# Patient Record
Sex: MALE | Race: WHITE | NOT HISPANIC OR LATINO | Employment: UNEMPLOYED | URBAN - METROPOLITAN AREA
[De-identification: names, ages, dates, MRNs, and addresses within clinical notes are randomized per-mention and may not be internally consistent; named-entity substitution may affect disease eponyms.]

---

## 2018-02-28 ENCOUNTER — APPOINTMENT (OUTPATIENT)
Dept: GENERAL RADIOLOGY | Facility: HOSPITAL | Age: 43
End: 2018-02-28

## 2018-02-28 ENCOUNTER — APPOINTMENT (OUTPATIENT)
Dept: CT IMAGING | Facility: HOSPITAL | Age: 43
End: 2018-02-28

## 2018-02-28 ENCOUNTER — HOSPITAL ENCOUNTER (EMERGENCY)
Facility: HOSPITAL | Age: 43
Discharge: HOME | End: 2018-02-28
Attending: EMERGENCY MEDICINE | Admitting: EMERGENCY MEDICINE

## 2018-02-28 VITALS
HEART RATE: 88 BPM | HEIGHT: 67 IN | BODY MASS INDEX: 25.11 KG/M2 | WEIGHT: 160 LBS | OXYGEN SATURATION: 92 % | SYSTOLIC BLOOD PRESSURE: 134 MMHG | TEMPERATURE: 98.2 F | DIASTOLIC BLOOD PRESSURE: 78 MMHG | RESPIRATION RATE: 16 BRPM

## 2018-02-28 DIAGNOSIS — S20.212A CONTUSION OF RIB ON LEFT SIDE, INITIAL ENCOUNTER: ICD-10-CM

## 2018-02-28 DIAGNOSIS — S16.1XXA STRAIN OF NECK MUSCLE, INITIAL ENCOUNTER: ICD-10-CM
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
## 2018-02-28 DIAGNOSIS — S39.012A STRAIN OF LUMBAR REGION, INITIAL ENCOUNTER: ICD-10-CM

## 2018-02-28 DIAGNOSIS — S29.019A THORACIC MYOFASCIAL STRAIN, INITIAL ENCOUNTER: ICD-10-CM

## 2018-02-28 PROCEDURE — 70450 CT HEAD/BRAIN W/O DYE: CPT

## 2018-02-28 PROCEDURE — 72072 X-RAY EXAM THORAC SPINE 3VWS: CPT

## 2018-02-28 PROCEDURE — 71101 X-RAY EXAM UNILAT RIBS/CHEST: CPT

## 2018-02-28 PROCEDURE — 99283 EMERGENCY DEPT VISIT LOW MDM: CPT

## 2018-02-28 PROCEDURE — 72110 X-RAY EXAM L-2 SPINE 4/>VWS: CPT

## 2018-02-28 PROCEDURE — 99284 EMERGENCY DEPT VISIT MOD MDM: CPT | Performed by: PHYSICIAN ASSISTANT

## 2018-02-28 PROCEDURE — 72050 X-RAY EXAM NECK SPINE 4/5VWS: CPT

## 2018-02-28 RX ORDER — HYDROCODONE BITARTRATE AND ACETAMINOPHEN 5; 325 MG/1; MG/1
1 TABLET ORAL EVERY 6 HOURS PRN
COMMUNITY
End: 2018-03-15

## 2018-02-28 RX ORDER — FAMOTIDINE 10 MG
10 TABLET ORAL 2 TIMES DAILY
COMMUNITY
End: 2018-03-15

## 2018-02-28 RX ORDER — NAPROXEN 375 MG/1
375 TABLET ORAL 2 TIMES DAILY PRN
Qty: 20 TABLET | Refills: 0 | Status: SHIPPED | OUTPATIENT
Start: 2018-02-28 | End: 2018-03-15

## 2018-02-28 RX ORDER — METHOCARBAMOL 750 MG/1
750 TABLET, FILM COATED ORAL 3 TIMES DAILY PRN
Qty: 20 TABLET | Refills: 0 | Status: SHIPPED | OUTPATIENT
Start: 2018-02-28 | End: 2018-03-15

## 2018-02-28 RX ORDER — IBUPROFEN 400 MG/1
800 TABLET ORAL ONCE
Status: DISCONTINUED | OUTPATIENT
Start: 2018-02-28 | End: 2018-02-28

## 2018-02-28 RX ORDER — IBUPROFEN 400 MG/1
800 TABLET ORAL ONCE
Status: COMPLETED | OUTPATIENT
Start: 2018-02-28 | End: 2018-02-28

## 2018-02-28 RX ORDER — CITALOPRAM 20 MG/1
20 TABLET ORAL DAILY
COMMUNITY

## 2018-02-28 RX ADMIN — IBUPROFEN 800 MG: 400 TABLET ORAL at 15:48

## 2018-03-01 ENCOUNTER — HOSPITAL ENCOUNTER (EMERGENCY)
Facility: HOSPITAL | Age: 43
Discharge: HOME | End: 2018-03-01
Attending: EMERGENCY MEDICINE | Admitting: EMERGENCY MEDICINE

## 2018-03-01 ENCOUNTER — APPOINTMENT (OUTPATIENT)
Dept: GENERAL RADIOLOGY | Facility: HOSPITAL | Age: 43
End: 2018-03-01

## 2018-03-01 VITALS
RESPIRATION RATE: 18 BRPM | SYSTOLIC BLOOD PRESSURE: 143 MMHG | HEART RATE: 72 BPM | BODY MASS INDEX: 26.71 KG/M2 | TEMPERATURE: 97.9 F | OXYGEN SATURATION: 99 % | HEIGHT: 67 IN | WEIGHT: 170.2 LBS | DIASTOLIC BLOOD PRESSURE: 94 MMHG

## 2018-03-01 DIAGNOSIS — S80.00XA CONTUSION OF KNEE, UNSPECIFIED LATERALITY, INITIAL ENCOUNTER: Primary | ICD-10-CM

## 2018-03-01 DIAGNOSIS — F07.81 POST CONCUSSION SYNDROME: ICD-10-CM

## 2018-03-01 PROCEDURE — 73562 X-RAY EXAM OF KNEE 3: CPT

## 2018-03-01 PROCEDURE — 99283 EMERGENCY DEPT VISIT LOW MDM: CPT

## 2018-03-01 PROCEDURE — 93010 ELECTROCARDIOGRAM REPORT: CPT | Performed by: INTERNAL MEDICINE

## 2018-03-01 PROCEDURE — 96372 THER/PROPH/DIAG INJ SC/IM: CPT

## 2018-03-01 PROCEDURE — 93005 ELECTROCARDIOGRAM TRACING: CPT | Performed by: PHYSICIAN ASSISTANT

## 2018-03-01 PROCEDURE — 99284 EMERGENCY DEPT VISIT MOD MDM: CPT | Performed by: PHYSICIAN ASSISTANT

## 2018-03-01 PROCEDURE — 25010000002 KETOROLAC TROMETHAMINE PER 15 MG: Performed by: PHYSICIAN ASSISTANT

## 2018-03-01 RX ORDER — KETOROLAC TROMETHAMINE 30 MG/ML
60 INJECTION, SOLUTION INTRAMUSCULAR; INTRAVENOUS ONCE
Status: COMPLETED | OUTPATIENT
Start: 2018-03-01 | End: 2018-03-01

## 2018-03-01 RX ORDER — MECLIZINE HYDROCHLORIDE 25 MG/1
25 TABLET ORAL 3 TIMES DAILY PRN
Qty: 20 TABLET | Refills: 0 | Status: SHIPPED | OUTPATIENT
Start: 2018-03-01 | End: 2018-03-15

## 2018-03-01 RX ADMIN — KETOROLAC TROMETHAMINE 60 MG: 30 INJECTION, SOLUTION INTRAMUSCULAR; INTRAVENOUS at 12:54

## 2018-03-01 NOTE — ED TRIAGE NOTES
"Pt reported his knees hurt today and that he had to use his mother's cane to walk into ER today.  He said he called for f/u but nobody answered.  Reported worse pain today 8/10 for back, head and now knees.  He said he took the prescriptions provided by ER yesterday and \"they didn't do any good\"  "

## 2018-03-01 NOTE — ED NOTES
Pt stated that he needed to go to the bathroom and ambulated down to the hallway with a cane.  He then stated that he had to go to his girlfriend's room to comfort her while she had blood drawn.  He said that she had horrible anxiety.  TIERA Jefferson notified.  She requested that patient return to his assigned room because the girlfriend wasn't going to get a blood draw.  Asked patient to return to his room and he returned with know complaints.     Jeni Padilla RN  03/01/18 0049

## 2018-03-01 NOTE — ED PROVIDER NOTES
Subjective   History of Present Illness    Review of Systems    Past Medical History:   Diagnosis Date   • Anxiety    • GERD (gastroesophageal reflux disease)    • Migraine    • Seizure disorder        Allergies   Allergen Reactions   • Codeine Anaphylaxis   • Antihistamines, Chlorpheniramine-Type Nausea Only       Past Surgical History:   Procedure Laterality Date   • SPLENECTOMY         History reviewed. No pertinent family history.    Social History     Social History   • Marital status: Single     Social History Main Topics   • Smoking status: Never Smoker   • Alcohol use No   • Drug use: No   • Sexual activity: Yes     Partners: Female           Objective   Physical Exam    Procedures         ED Course  ED Course                  MDM    Final diagnoses:   None
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
## 2018-03-15 ENCOUNTER — HOSPITAL ENCOUNTER (EMERGENCY)
Facility: HOSPITAL | Age: 43
Discharge: HOME | End: 2018-03-15
Attending: EMERGENCY MEDICINE | Admitting: EMERGENCY MEDICINE

## 2018-03-15 VITALS
TEMPERATURE: 98.2 F | OXYGEN SATURATION: 99 % | HEIGHT: 67 IN | BODY MASS INDEX: 25.11 KG/M2 | WEIGHT: 160 LBS | RESPIRATION RATE: 16 BRPM | SYSTOLIC BLOOD PRESSURE: 128 MMHG | HEART RATE: 79 BPM | DIASTOLIC BLOOD PRESSURE: 73 MMHG

## 2018-03-15 DIAGNOSIS — M54.6 CHRONIC BILATERAL THORACIC BACK PAIN: ICD-10-CM

## 2018-03-15 DIAGNOSIS — M54.2 NECK PAIN: Primary | ICD-10-CM

## 2018-03-15 DIAGNOSIS — G89.29 CHRONIC BILATERAL THORACIC BACK PAIN: ICD-10-CM

## 2018-03-15 PROCEDURE — 99284 EMERGENCY DEPT VISIT MOD MDM: CPT | Performed by: PHYSICIAN ASSISTANT

## 2018-03-15 PROCEDURE — 99283 EMERGENCY DEPT VISIT LOW MDM: CPT

## 2018-03-15 RX ORDER — HYDROCODONE BITARTRATE AND ACETAMINOPHEN 5; 325 MG/1; MG/1
1 TABLET ORAL ONCE
Status: COMPLETED | OUTPATIENT
Start: 2018-03-15 | End: 2018-03-15

## 2018-03-15 RX ORDER — NAPROXEN 375 MG/1
375 TABLET ORAL 2 TIMES DAILY PRN
Qty: 20 TABLET | Refills: 0 | OUTPATIENT
Start: 2018-03-15 | End: 2018-12-22

## 2018-03-15 RX ADMIN — HYDROCODONE BITARTRATE AND ACETAMINOPHEN 1 TABLET: 5; 325 TABLET ORAL at 14:05
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
## 2018-12-19 ENCOUNTER — HOSPITAL ENCOUNTER (EMERGENCY)
Facility: HOSPITAL | Age: 43
Discharge: HOME | End: 2018-12-19
Attending: EMERGENCY MEDICINE | Admitting: EMERGENCY MEDICINE

## 2018-12-19 VITALS
RESPIRATION RATE: 18 BRPM | HEIGHT: 65 IN | HEART RATE: 68 BPM | SYSTOLIC BLOOD PRESSURE: 144 MMHG | BODY MASS INDEX: 28.66 KG/M2 | WEIGHT: 172 LBS | OXYGEN SATURATION: 100 % | DIASTOLIC BLOOD PRESSURE: 95 MMHG | TEMPERATURE: 98 F

## 2018-12-19 DIAGNOSIS — S01.81XA FACIAL LACERATION, INITIAL ENCOUNTER: Primary | ICD-10-CM

## 2018-12-19 PROCEDURE — 12013 RPR F/E/E/N/L/M 2.6-5.0 CM: CPT | Performed by: EMERGENCY MEDICINE

## 2018-12-19 PROCEDURE — 90471 IMMUNIZATION ADMIN: CPT

## 2018-12-19 PROCEDURE — 90715 TDAP VACCINE 7 YRS/> IM: CPT

## 2018-12-19 PROCEDURE — 99283 EMERGENCY DEPT VISIT LOW MDM: CPT

## 2018-12-19 PROCEDURE — 25010000002 TDAP 5-2.5-18.5 LF-MCG/0.5 SUSPENSION

## 2018-12-19 RX ORDER — GINSENG 100 MG
CAPSULE ORAL ONCE
Status: COMPLETED | OUTPATIENT
Start: 2018-12-19 | End: 2018-12-19

## 2018-12-19 RX ORDER — FAMOTIDINE 20 MG/1
20 TABLET, FILM COATED ORAL 2 TIMES DAILY
COMMUNITY
End: 2018-12-22 | Stop reason: SDUPTHER

## 2018-12-19 RX ORDER — HYDROCODONE BITARTRATE AND ACETAMINOPHEN 7.5; 325 MG/1; MG/1
1 TABLET ORAL EVERY 6 HOURS PRN
COMMUNITY

## 2018-12-19 RX ADMIN — Medication: at 18:25

## 2018-12-19 RX ADMIN — TETANUS TOXOID, REDUCED DIPHTHERIA TOXOID AND ACELLULAR PERTUSSIS VACCINE, ADSORBED 0.5 ML: 5; 2.5; 8; 8; 2.5 SUSPENSION INTRAMUSCULAR at 17:04

## 2018-12-19 NOTE — DISCHARGE INSTRUCTIONS
Keep clean and dry for 24 hours.  Then wash 3 times daily with antibacterial soap, pat dry and apply Neosporin or Bacitracin.  Continue until healed. Follow up with your PCP in 2-3 days for a wound check; in 5 days for suture removal. Sooner for signs of infection.  Return to ED for signs of infection, medical emergencies

## 2018-12-19 NOTE — ED PROVIDER NOTES
Subjective   Mr. Noe Pike is a 44 yo WM who presents secondary to a facial laceration.  Patient was helping his father removed doorframe.  The crowbar slipped striking patient in the left cheek.  He sustained a laceration.  Associated bleeding.  No loss of consciousness.  No epistaxis.  No facial deformity.  Patient presents for evaluation.        History provided by:  Patient  Facial Injury   Mechanism of injury:  Laceration  Location:  L cheek  Time since incident:  30 minutes  Pain details:     Severity:  No pain  Foreign body present:  No foreign bodies  Relieved by:  Nothing  Worsened by:  Nothing  Associated symptoms: no altered mental status, no congestion, no difficulty breathing, no double vision, no ear pain, no epistaxis, no headaches, no loss of consciousness, no malocclusion, no nausea, no neck pain, no rhinorrhea, no trismus, no vomiting and no wheezing    Risk factors: no alcohol use, no bone disorder, no concern for non-accidental trauma, no frequent falls and no prior injuries to these areas        Review of Systems   Constitutional: Negative for chills, diaphoresis and fever.   HENT: Negative for congestion, ear pain, nosebleeds, rhinorrhea and sore throat.    Eyes: Negative for double vision, pain and discharge.   Respiratory: Negative for chest tightness, shortness of breath, wheezing and stridor.    Cardiovascular: Negative for chest pain, palpitations and leg swelling.   Gastrointestinal: Negative for abdominal pain, diarrhea, nausea and vomiting.   Genitourinary: Negative for dysuria, flank pain, frequency and hematuria.   Musculoskeletal: Negative for back pain, myalgias, neck pain and neck stiffness.   Skin: Negative for color change, pallor and rash.   Neurological: Negative for dizziness, seizures, loss of consciousness, syncope and headaches.   Psychiatric/Behavioral: Negative for agitation and confusion. The patient is not nervous/anxious.    All other systems reviewed and are  negative.      Past Medical History:   Diagnosis Date   • Anxiety    • Chronic back pain    • GERD (gastroesophageal reflux disease)    • Migraine    • Seizure disorder (CMS/HCC)        Allergies   Allergen Reactions   • Codeine Anaphylaxis   • Antihistamines, Chlorpheniramine-Type Nausea Only       Past Surgical History:   Procedure Laterality Date   • SPLENECTOMY         History reviewed. No pertinent family history.    Social History     Socioeconomic History   • Marital status: Single     Spouse name: Not on file   • Number of children: Not on file   • Years of education: Not on file   • Highest education level: Not on file   Tobacco Use   • Smoking status: Never Smoker   Substance and Sexual Activity   • Alcohol use: No   • Drug use: No   • Sexual activity: Yes     Partners: Female           Objective   Physical Exam   Constitutional: He appears well-developed and well-nourished. No distress.   44 yo WM lying in bed. Pt appears in excellent health. Signs unremarkable.  Patient is accompanied by his spouse.   HENT:   Head: Normocephalic. Head is with laceration.       Right Ear: External ear normal.   Left Ear: External ear normal.   Nose: Nose normal.   Mouth/Throat: Oropharynx is clear and moist.   Eyes: Conjunctivae and EOM are normal. Pupils are equal, round, and reactive to light.   Neck: Normal range of motion. Neck supple.   Skin: He is not diaphoretic.   Nursing note and vitals reviewed.      Laceration Repair  Date/Time: 12/19/2018 5:45 PM  Performed by: Rodolfo Coleman MD  Authorized by: Rodolfo Coleman MD     Consent:     Consent obtained:  Verbal    Consent given by:  Patient    Risks discussed:  Infection, pain, poor cosmetic result and poor wound healing    Alternatives discussed:  No treatment  Anesthesia (see MAR for exact dosages):     Anesthesia method:  Nerve block    Block location:  Infra-orbital    Block needle gauge:  27 G    Block anesthetic:  Lidocaine 2% WITH epi    Block  technique:  Infra-orbital    Block injection procedure:  Anatomic landmarks identified, anatomic landmarks palpated, introduced needle, negative aspiration for blood and incremental injection    Block outcome:  Anesthesia achieved  Laceration details:     Location:  Face    Face location:  L cheek    Length (cm):  4    Depth (mm):  5  Repair type:     Repair type:  Simple  Pre-procedure details:     Preparation:  Patient was prepped and draped in usual sterile fashion  Exploration:     Wound exploration: entire depth of wound probed and visualized      Wound extent: no fascia violation noted, no foreign bodies/material noted, no muscle damage noted, no nerve damage noted, no tendon damage noted, no underlying fracture noted and no vascular damage noted      Contaminated: no    Treatment:     Area cleansed with:  Hibiclens and saline    Amount of cleaning:  Standard    Irrigation solution:  Sterile saline    Irrigation method:  Syringe  Skin repair:     Repair method:  Sutures    Suture size:  6-0    Suture material:  Nylon    Suture technique:  Simple interrupted    Number of sutures:  5  Approximation:     Approximation:  Close    Vermilion border: well-aligned    Post-procedure details:     Dressing:  Antibiotic ointment and non-adherent dressing    Patient tolerance of procedure:  Tolerated with difficulty  Comments:      Patient had significant pain on infraorbital block.  He was screaming and slamming his hand against the exam room wall.  However he tolerated the rest of the procedure well.               ED Course  ED Course as of Dec 20 0035   Wed Dec 19, 2018   1717 Patient has a laceration left cheek.  Well anesthetized, cleaned and suture.  [SS]   1719 Patient helping his father with a doorframe.  Crowbar slipped striking patient in the left cheek.  He sustained a laceration.  [SS]   1819 laceration closed with 5 simple interrupted sutures.  Patient tolerated well.  Discussed wound care, follow-up to  "monitor for infection.  Will DC home.    Patient states that he is a \"condition\" that requires daily sexual intercourse. He states he perspires during intercourse.  Is worried about perspiration condemned to the laceration.  Recommended patient to apply an additional layer of bacitracin before and after intercourse.  Also recommended he could keep a towel nearby and towel his face if needed.  [SS]      ED Course User Index  [SS] Rodolfo Coleman MD                  MDM  Number of Diagnoses or Management Options  Facial laceration, initial encounter: new and does not require workup  Risk of Complications, Morbidity, and/or Mortality  Presenting problems: low  Diagnostic procedures: low  Management options: moderate    Patient Progress  Patient progress: improved        Final diagnoses:   Facial laceration, initial encounter            Rodolfo Coleman MD  12/20/18 0035    "

## 2018-12-22 ENCOUNTER — HOSPITAL ENCOUNTER (EMERGENCY)
Facility: HOSPITAL | Age: 43
Discharge: HOME | End: 2018-12-22
Attending: EMERGENCY MEDICINE | Admitting: EMERGENCY MEDICINE

## 2018-12-22 VITALS
RESPIRATION RATE: 18 BRPM | BODY MASS INDEX: 27 KG/M2 | WEIGHT: 172 LBS | OXYGEN SATURATION: 100 % | TEMPERATURE: 97.3 F | HEIGHT: 67 IN | DIASTOLIC BLOOD PRESSURE: 90 MMHG | HEART RATE: 68 BPM | SYSTOLIC BLOOD PRESSURE: 128 MMHG

## 2018-12-22 DIAGNOSIS — Z48.02 VISIT FOR SUTURE REMOVAL: ICD-10-CM

## 2018-12-22 DIAGNOSIS — T78.40XA ALLERGIC REACTION, INITIAL ENCOUNTER: Primary | ICD-10-CM

## 2018-12-22 PROCEDURE — 96374 THER/PROPH/DIAG INJ IV PUSH: CPT

## 2018-12-22 PROCEDURE — 96375 TX/PRO/DX INJ NEW DRUG ADDON: CPT

## 2018-12-22 PROCEDURE — 25010000002 METHYLPREDNISOLONE PER 125 MG: Performed by: EMERGENCY MEDICINE

## 2018-12-22 PROCEDURE — 99282 EMERGENCY DEPT VISIT SF MDM: CPT | Performed by: EMERGENCY MEDICINE

## 2018-12-22 PROCEDURE — 99283 EMERGENCY DEPT VISIT LOW MDM: CPT

## 2018-12-22 RX ORDER — SODIUM CHLORIDE 0.9 % (FLUSH) 0.9 %
10 SYRINGE (ML) INJECTION AS NEEDED
Status: DISCONTINUED | OUTPATIENT
Start: 2018-12-22 | End: 2018-12-22 | Stop reason: HOSPADM

## 2018-12-22 RX ORDER — METHYLPREDNISOLONE SODIUM SUCCINATE 125 MG/2ML
125 INJECTION, POWDER, LYOPHILIZED, FOR SOLUTION INTRAMUSCULAR; INTRAVENOUS ONCE
Status: COMPLETED | OUTPATIENT
Start: 2018-12-22 | End: 2018-12-22

## 2018-12-22 RX ORDER — METHYLPREDNISOLONE 4 MG/1
TABLET ORAL
Qty: 21 TABLET | Refills: 0 | Status: SHIPPED | OUTPATIENT
Start: 2018-12-23 | End: 2018-12-28

## 2018-12-22 RX ORDER — FAMOTIDINE 20 MG/1
20 TABLET, FILM COATED ORAL 2 TIMES DAILY
Qty: 14 TABLET | Refills: 0 | Status: SHIPPED | OUTPATIENT
Start: 2018-12-22 | End: 2018-12-29

## 2018-12-22 RX ADMIN — METHYLPREDNISOLONE SODIUM SUCCINATE 125 MG: 125 INJECTION, POWDER, FOR SOLUTION INTRAMUSCULAR; INTRAVENOUS at 18:42

## 2018-12-22 RX ADMIN — FAMOTIDINE 20 MG: 10 INJECTION, SOLUTION INTRAVENOUS at 18:43

## 2018-12-22 NOTE — ED PROVIDER NOTES
Subjective   Mr. Noe Pike is a 44 yo WM who presents secondary to allergic reaction.  Patient took an ibuprofen approximately one and half hours ago.  Shortly thereafter he began sneezing itching in his feet and hands.  This was followed by a body wide itching, body wide erythema and development of rash.  Patient took Benadryl personally 30 minutes ago.  His symptoms began improving after Benadryl.  However patient had a questionable syncopal event per EMS.  They were called to the scene.  Patient was stable in route.  He presents for evaluation.    Patient has had prior allergic reactions to antihistamines although there are years ago and patient does not know the specifics of the reaction.    Patient was seen in this ER by me on 12/19/18 for a facial laceration.        History provided by:  Patient  Allergic Reaction   Presenting symptoms: itching and rash    Presenting symptoms: no wheezing    Itching:     Location:  Full body    Duration: 1.5.    Timing:  Constant    Progression:  Improving  Severity:  Mild (body wide)  Duration: 1.5.  Prior allergic episodes:  Allergies to medications  Context: medications (codeine and anti-histamines)    Relieved by: to is improved after Benadryl.      Review of Systems   Constitutional: Negative for chills, diaphoresis and fever.   HENT: Negative for congestion, ear pain and sore throat.    Eyes: Negative for pain and discharge.   Respiratory: Negative for chest tightness, shortness of breath, wheezing and stridor.    Cardiovascular: Negative for chest pain, palpitations and leg swelling.   Gastrointestinal: Negative for abdominal pain, diarrhea, nausea and vomiting.   Genitourinary: Negative for dysuria, flank pain, frequency and hematuria.   Musculoskeletal: Negative for back pain, myalgias, neck pain and neck stiffness.   Skin: Positive for itching and rash. Negative for color change and pallor.   Neurological: Negative for dizziness, seizures, syncope and headaches.    Psychiatric/Behavioral: Negative for agitation and confusion. The patient is not nervous/anxious.    All other systems reviewed and are negative.      Past Medical History:   Diagnosis Date   • Anxiety    • Chronic back pain    • GERD (gastroesophageal reflux disease)    • Migraine    • Seizure disorder (CMS/HCC)        Allergies   Allergen Reactions   • Codeine Anaphylaxis   • Antihistamines, Chlorpheniramine-Type Nausea Only       Past Surgical History:   Procedure Laterality Date   • SPLENECTOMY         History reviewed. No pertinent family history.    Social History     Socioeconomic History   • Marital status: Single     Spouse name: Not on file   • Number of children: Not on file   • Years of education: Not on file   • Highest education level: Not on file   Tobacco Use   • Smoking status: Never Smoker   Substance and Sexual Activity   • Alcohol use: No   • Drug use: No   • Sexual activity: Yes     Partners: Female           Objective   Physical Exam   Constitutional: He is oriented to person, place, and time. He appears well-developed and well-nourished. No distress.   HENT:   Head: Normocephalic and atraumatic.   Right Ear: External ear normal.   Left Ear: External ear normal.   Nose: Nose normal.   Mouth/Throat: Oropharynx is clear and moist.   Eyes: Conjunctivae and EOM are normal. Pupils are equal, round, and reactive to light.   Neck: Normal range of motion. Neck supple.   Cardiovascular: Normal rate, regular rhythm, normal heart sounds and intact distal pulses. Exam reveals no gallop and no friction rub.   No murmur heard.  Pulmonary/Chest: Effort normal and breath sounds normal. No stridor. No respiratory distress. He has no wheezes. He has no rales.   Abdominal: Soft. He exhibits no distension. There is no tenderness.   Musculoskeletal: Normal range of motion. He exhibits no edema.   Neurological: He is alert and oriented to person, place, and time. He has normal reflexes. No cranial nerve deficit.    Skin: Skin is warm and dry. Rash noted. He is not diaphoretic. There is erythema.   A body wide rash and erythema consistent with allergic reaction.   Psychiatric: He has a normal mood and affect. His behavior is normal.   Nursing note and vitals reviewed.      Procedures           ED Course  ED Course as of Dec 22 2027   Sat Dec 22, 2018   1833 Patient having an allergic reaction.  Apparently to ibuprofen.  Will place Iv.  Giving Solu-Medrol and Pepcid.  Patient took oral Benadryl at home.  [SS]   1950  feeling much better.  Responded well to Solu-Medrol and Pepcid.  Erythema and rash almost completely resolved.  Patient's lungs again clear to auscultation.  No angioedema.  Will prescribe steroids and Pepcid for home.    Patient wires about suture removal.  I repaired his facial laceration on the 19th.  Wound is healing well.  Stitches have been present for 3 days.  Will remove prior to discharge.  [SS]      ED Course User Index  [SS] Rodolfo Coleman MD      .[email or web address]            MDM  Number of Diagnoses or Management Options  Allergic reaction, initial encounter: new and does not require workup  Visit for suture removal: minor  Risk of Complications, Morbidity, and/or Mortality  Presenting problems: moderate  Diagnostic procedures: low  Management options: moderate    Patient Progress  Patient progress: improved        Final diagnoses:   Allergic reaction, initial encounter   Visit for suture removal            Rodolfo Coleman MD  12/22/18 2027

## 2018-12-23 NOTE — DISCHARGE INSTRUCTIONS
Did not take ibuprofen, naproxen or any other NSAID until you have undergone allergy testing.  Follow-up with family allergy and asthma or an allergist of your choice as above.  Medications as directed.  Wound care as above.  Return to ED for recurrent symptoms, medical emergencies.

## 2022-08-15 ENCOUNTER — TRANSCRIBE ORDERS (OUTPATIENT)
Dept: ADMINISTRATIVE | Facility: HOSPITAL | Age: 47
End: 2022-08-15

## 2022-08-15 ENCOUNTER — HOSPITAL ENCOUNTER (OUTPATIENT)
Dept: GENERAL RADIOLOGY | Facility: HOSPITAL | Age: 47
Discharge: HOME | End: 2022-08-15
Admitting: FAMILY MEDICINE

## 2022-08-15 DIAGNOSIS — R06.02 SHORTNESS OF BREATH: ICD-10-CM

## 2022-08-15 DIAGNOSIS — R06.02 SHORTNESS OF BREATH: Primary | ICD-10-CM

## 2022-08-15 PROCEDURE — 71046 X-RAY EXAM CHEST 2 VIEWS: CPT

## 2022-10-06 ENCOUNTER — OFFICE VISIT (OUTPATIENT)
Dept: CARDIOLOGY | Facility: CLINIC | Age: 47
End: 2022-10-06

## 2022-10-06 VITALS
HEART RATE: 67 BPM | SYSTOLIC BLOOD PRESSURE: 134 MMHG | BODY MASS INDEX: 27.56 KG/M2 | WEIGHT: 175.6 LBS | HEIGHT: 67 IN | DIASTOLIC BLOOD PRESSURE: 72 MMHG

## 2022-10-06 DIAGNOSIS — R06.02 EXERTIONAL SHORTNESS OF BREATH: Primary | ICD-10-CM

## 2022-10-06 DIAGNOSIS — E78.00 HYPERCHOLESTEROLEMIA: ICD-10-CM

## 2022-10-06 PROCEDURE — 99204 OFFICE O/P NEW MOD 45 MIN: CPT | Performed by: INTERNAL MEDICINE

## 2022-10-06 PROCEDURE — 93000 ELECTROCARDIOGRAM COMPLETE: CPT | Performed by: INTERNAL MEDICINE

## 2022-10-06 RX ORDER — FAMOTIDINE 20 MG/1
20 TABLET, FILM COATED ORAL EVERY 12 HOURS
COMMUNITY
Start: 2022-09-13

## 2022-10-06 NOTE — PROGRESS NOTES
PATIENTINFORMATION    Date of Office Visit: 10/06/2022  Encounter Provider: Gal Stiles MD  Place of Service: [facility] CARDIOLOGY  Patient Name: Noe Pike  : 1975    Subjective:     Encounter Date:10/06/2022      Patient ID: Noe Pike is a 47 y.o. male.    Chief Complaint   Patient presents with   • new patient     HPI  Mr. Pike is a pleasant 48 yo man who is here for evaluation of sob and chest pain.  He was accompanied by his wife.  He reports progressive worsening shortness of breath with intermittent chest discomfort of about a year duration.  He has past medical history significant for bronchial asthma and allergies for which he takes regular treatment.  Shortness of breath usually comes with exertion along with some chest tightness that he describes as pressure all variable severity.  Symptoms vary from day-to-day.  He denies any significant palpitations, presyncope or syncope, orthopnea, PND or significant lower extremity swelling.  No prior cardiovascular procedures.  Family history significant for coronary artery disease with his dad at a younger age.    He denies any tobacco use, recreational drug use or alcohol abuse.      ROS  All systems reviewed and negative except as noted in HPI.    Past Medical History:   Diagnosis Date   • Anxiety    • Chronic back pain    • GERD (gastroesophageal reflux disease)    • Migraine    • Seizure disorder (HCC)        Past Surgical History:   Procedure Laterality Date   • SPLENECTOMY         Social History     Socioeconomic History   • Marital status: Single   Tobacco Use   • Smoking status: Never Smoker   Substance and Sexual Activity   • Alcohol use: No   • Drug use: No   • Sexual activity: Yes     Partners: Female       History reviewed. No pertinent family history.        ECG 12 Lead    Date/Time: 10/6/2022 3:20 PM  Performed by: Gal Stiles MD  Authorized by: Gal Stiles MD   Comparison: compared  "with previous ECG from 3/1/2018  Similar to previous ECG  Rhythm: sinus rhythm  Rate: normal  Conduction: conduction normal  ST Segments: ST segments normal  T Waves: T waves normal  QRS axis: normal  Other: no other findings    Clinical impression: normal ECG               Objective:     /72 (BP Location: Left arm, Patient Position: Sitting)   Pulse 67   Ht 170.2 cm (67\")   Wt 79.7 kg (175 lb 9.6 oz)   BMI 27.50 kg/m²  Body mass index is 27.5 kg/m².     Constitutional:       General: Not in acute distress.     Appearance: Well-developed. Not diaphoretic.   Eyes:      Pupils: Pupils are equal, round, and reactive to light.   HENT:      Head: Normocephalic and atraumatic.   Neck:      Thyroid: No thyromegaly.   Pulmonary:      Effort: Pulmonary effort is normal. No respiratory distress.      Breath sounds: Normal breath sounds. No wheezing. No rales.   Chest:      Chest wall: Not tender to palpatation.   Cardiovascular:      Normal rate. Irregularly irregular rhythm.      No gallop.   Pulses:     Intact distal pulses.   Edema:     Peripheral edema absent.   Abdominal:      General: Bowel sounds are normal. There is no distension.      Palpations: Abdomen is soft.      Tenderness: There is no guarding.   Musculoskeletal: Normal range of motion.         General: No deformity.      Cervical back: Normal range of motion and neck supple. Skin:     General: Skin is warm and dry.      Findings: No rash.   Neurological:      Mental Status: Alert and oriented to person, place, and time.      Cranial Nerves: No cranial nerve deficit.      Deep Tendon Reflexes: Reflexes are normal and symmetric.   Psychiatric:         Judgment: Judgment normal.         Review Of Data: I have reviewed pertinent recent labs, images and documents and pertinent findings included in HPI or assessment below.          Assessment/Plan:         1.  Exertional shortness of breath and chest discomfort with some atypical component  Family history " of premature coronary artery disease  Personal history of high cholesterol not on treatment  Get stress echocardiogram , lipid panel     2.  History of bronchial asthma and allergies  3. Hx of seizure disorder.   4.  Hypercholesterolemia      Diagnosis and plan of care discussed with patient and verbalized understanding.            Your medication list          Accurate as of October 6, 2022  4:24 PM. If you have any questions, ask your nurse or doctor.            CONTINUE taking these medications      Instructions Last Dose Given Next Dose Due   citalopram 20 MG tablet  Commonly known as: CeleXA      Take 20 mg by mouth Daily.       DILANTIN PO      Take 100 mg by mouth 2 (Two) Times a Day.       famotidine 20 MG tablet  Commonly known as: PEPCID      Take 20 mg by mouth Every 12 (Twelve) Hours.       HYDROcodone-acetaminophen 7.5-325 MG per tablet  Commonly known as: NORCO      Take 1 tablet by mouth Every 6 (Six) Hours As Needed for Moderate Pain .                  Gal Stiles MD  10/06/22  16:24 EDT

## 2022-10-13 ENCOUNTER — HOSPITAL ENCOUNTER (OUTPATIENT)
Dept: CARDIOLOGY | Facility: HOSPITAL | Age: 47
Discharge: HOME | End: 2022-10-13
Admitting: INTERNAL MEDICINE

## 2022-10-13 VITALS
SYSTOLIC BLOOD PRESSURE: 134 MMHG | WEIGHT: 175 LBS | HEIGHT: 67 IN | DIASTOLIC BLOOD PRESSURE: 72 MMHG | BODY MASS INDEX: 27.47 KG/M2

## 2022-10-13 DIAGNOSIS — R06.02 EXERTIONAL SHORTNESS OF BREATH: ICD-10-CM

## 2022-10-13 LAB
AORTIC DIMENSIONLESS INDEX: 0.9 (DI)
BH CV ECHO MEAS - ACS: 2.5 CM
BH CV ECHO MEAS - AO MAX PG: 4.6 MMHG
BH CV ECHO MEAS - AO MEAN PG: 3 MMHG
BH CV ECHO MEAS - AO ROOT DIAM: 3.7 CM
BH CV ECHO MEAS - AO V2 MAX: 107 CM/SEC
BH CV ECHO MEAS - AO V2 VTI: 21.7 CM
BH CV ECHO MEAS - AVA(I,D): 2.9 CM2
BH CV ECHO MEAS - EDV(CUBED): 103.8 ML
BH CV ECHO MEAS - EDV(MOD-SP2): 129 ML
BH CV ECHO MEAS - EDV(MOD-SP4): 124 ML
BH CV ECHO MEAS - EF(MOD-BP): 62.1 %
BH CV ECHO MEAS - EF(MOD-SP2): 64.3 %
BH CV ECHO MEAS - EF(MOD-SP4): 61.3 %
BH CV ECHO MEAS - ESV(CUBED): 36.6 ML
BH CV ECHO MEAS - ESV(MOD-SP2): 46 ML
BH CV ECHO MEAS - ESV(MOD-SP4): 48 ML
BH CV ECHO MEAS - FS: 29.3 %
BH CV ECHO MEAS - IVS/LVPW: 1 CM
BH CV ECHO MEAS - IVSD: 1 CM
BH CV ECHO MEAS - LAT PEAK E' VEL: 10.4 CM/SEC
BH CV ECHO MEAS - LV DIASTOLIC VOL/BSA (35-75): 64.9 CM2
BH CV ECHO MEAS - LV MASS(C)D: 164.5 GRAMS
BH CV ECHO MEAS - LV MAX PG: 4.2 MMHG
BH CV ECHO MEAS - LV MEAN PG: 2 MMHG
BH CV ECHO MEAS - LV SYSTOLIC VOL/BSA (12-30): 25.1 CM2
BH CV ECHO MEAS - LV V1 MAX: 102 CM/SEC
BH CV ECHO MEAS - LV V1 VTI: 19.1 CM
BH CV ECHO MEAS - LVIDD: 4.7 CM
BH CV ECHO MEAS - LVIDS: 3.3 CM
BH CV ECHO MEAS - LVOT AREA: 3.3 CM2
BH CV ECHO MEAS - LVOT DIAM: 2.04 CM
BH CV ECHO MEAS - LVPWD: 1 CM
BH CV ECHO MEAS - MED PEAK E' VEL: 10.3 CM/SEC
BH CV ECHO MEAS - MV A MAX VEL: 70.2 CM/SEC
BH CV ECHO MEAS - MV DEC SLOPE: 323.7 CM/SEC2
BH CV ECHO MEAS - MV DEC TIME: 0.22 MSEC
BH CV ECHO MEAS - MV E MAX VEL: 68.1 CM/SEC
BH CV ECHO MEAS - MV E/A: 0.97
BH CV ECHO MEAS - MV MAX PG: 3.2 MMHG
BH CV ECHO MEAS - MV MEAN PG: 1.43 MMHG
BH CV ECHO MEAS - MV P1/2T: 82.7 MSEC
BH CV ECHO MEAS - MV V2 VTI: 26.1 CM
BH CV ECHO MEAS - MVA(P1/2T): 2.7 CM2
BH CV ECHO MEAS - MVA(VTI): 2.39 CM2
BH CV ECHO MEAS - PA V2 MAX: 91.4 CM/SEC
BH CV ECHO MEAS - RAP SYSTOLE: 3 MMHG
BH CV ECHO MEAS - RV MAX PG: 2.18 MMHG
BH CV ECHO MEAS - RV V1 MAX: 73.9 CM/SEC
BH CV ECHO MEAS - RV V1 VTI: 18.9 CM
BH CV ECHO MEAS - SI(MOD-SP2): 43.4 ML/M2
BH CV ECHO MEAS - SI(MOD-SP4): 39.8 ML/M2
BH CV ECHO MEAS - SV(LVOT): 62.2 ML
BH CV ECHO MEAS - SV(MOD-SP2): 83 ML
BH CV ECHO MEAS - SV(MOD-SP4): 76 ML
BH CV ECHO MEASUREMENTS AVERAGE E/E' RATIO: 6.58
BH CV STRESS BP STAGE 1: NORMAL
BH CV STRESS BP STAGE 2: NORMAL
BH CV STRESS BP STAGE 3: NORMAL
BH CV STRESS BP STAGE 4: NORMAL
BH CV STRESS DURATION MIN STAGE 1: 3
BH CV STRESS DURATION MIN STAGE 2: 3
BH CV STRESS DURATION MIN STAGE 3: 3
BH CV STRESS DURATION SEC STAGE 1: 0
BH CV STRESS DURATION SEC STAGE 2: 0
BH CV STRESS DURATION SEC STAGE 3: 0
BH CV STRESS DURATION SEC STAGE 4: 59
BH CV STRESS ECHO POST STRESS EJECTION FRACTION EF: 73 %
BH CV STRESS GRADE STAGE 1: 10
BH CV STRESS GRADE STAGE 2: 12
BH CV STRESS GRADE STAGE 3: 14
BH CV STRESS GRADE STAGE 4: 16
BH CV STRESS HR STAGE 1: 94
BH CV STRESS HR STAGE 2: 104
BH CV STRESS HR STAGE 3: 121
BH CV STRESS HR STAGE 4: 150
BH CV STRESS METS STAGE 1: 4.6
BH CV STRESS METS STAGE 2: 7.1
BH CV STRESS METS STAGE 3: 10.2
BH CV STRESS METS STAGE 4: 11.8
BH CV STRESS PROTOCOL 1: NORMAL
BH CV STRESS RECOVERY BP: NORMAL MMHG
BH CV STRESS RECOVERY HR: 94 BPM
BH CV STRESS SPEED STAGE 1: 1.7
BH CV STRESS SPEED STAGE 2: 2.5
BH CV STRESS SPEED STAGE 3: 3.4
BH CV STRESS SPEED STAGE 4: 4.2
BH CV STRESS STAGE 1: 1
BH CV STRESS STAGE 2: 2
BH CV STRESS STAGE 3: 3
BH CV STRESS STAGE 4: 4
BH CV XLRA - RV BASE: 3.3 CM
BH CV XLRA - RV LENGTH: 5.8 CM
BH CV XLRA - RV MID: 2.9 CM
BH CV XLRA - TDI S': 14.1 CM/SEC
LEFT ATRIUM VOLUME INDEX: 20.3 ML/M2
MAXIMAL PREDICTED HEART RATE: 174 BPM
PERCENT MAX PREDICTED HR: 88.51 %
SINUS: 3.2 CM
STJ: 2.6 CM
STRESS BASELINE BP: NORMAL MMHG
STRESS BASELINE HR: 65 BPM
STRESS O2 SAT REST: 100 %
STRESS PERCENT HR: 104 %
STRESS POST ESTIMATED WORKLOAD: 11.8 METS
STRESS POST EXERCISE DUR MIN: 10 MIN
STRESS POST EXERCISE DUR SEC: 0 SEC
STRESS POST O2 SAT PEAK: 100 %
STRESS POST PEAK BP: NORMAL MMHG
STRESS POST PEAK HR: 154 BPM
STRESS TARGET HR: 148 BPM

## 2022-10-13 PROCEDURE — 93320 DOPPLER ECHO COMPLETE: CPT

## 2022-10-13 PROCEDURE — 93325 DOPPLER ECHO COLOR FLOW MAPG: CPT | Performed by: INTERNAL MEDICINE

## 2022-10-13 PROCEDURE — 93018 CV STRESS TEST I&R ONLY: CPT | Performed by: INTERNAL MEDICINE

## 2022-10-13 PROCEDURE — 93352 ADMIN ECG CONTRAST AGENT: CPT | Performed by: INTERNAL MEDICINE

## 2022-10-13 PROCEDURE — 93017 CV STRESS TEST TRACING ONLY: CPT

## 2022-10-13 PROCEDURE — 93350 STRESS TTE ONLY: CPT | Performed by: INTERNAL MEDICINE

## 2022-10-13 PROCEDURE — 93016 CV STRESS TEST SUPVJ ONLY: CPT | Performed by: INTERNAL MEDICINE

## 2022-10-13 PROCEDURE — 93350 STRESS TTE ONLY: CPT

## 2022-10-13 PROCEDURE — 93320 DOPPLER ECHO COMPLETE: CPT | Performed by: INTERNAL MEDICINE

## 2022-10-13 PROCEDURE — 25010000002 PERFLUTREN (DEFINITY) 8.476 MG IN SODIUM CHLORIDE (PF) 0.9 % 10 ML INJECTION: Performed by: INTERNAL MEDICINE

## 2022-10-13 PROCEDURE — 93325 DOPPLER ECHO COLOR FLOW MAPG: CPT

## 2022-10-13 RX ADMIN — SODIUM CHLORIDE 4 ML: 9 INJECTION INTRAMUSCULAR; INTRAVENOUS; SUBCUTANEOUS at 14:16

## 2022-10-13 NOTE — PROGRESS NOTES
Please notify patient that stress test does not show significant coronary artery disease.  His heart and valve functions are normal.  Can you check if he has had lipid panel checked?  Encourage regular physical activity and 1 year follow-up visit.  I may consider doing coronary calcium score during follow-up visit.  Let me know if he has any questions.  Thank you

## 2022-10-17 ENCOUNTER — TELEPHONE (OUTPATIENT)
Dept: CARDIOLOGY | Facility: CLINIC | Age: 47
End: 2022-10-17

## 2022-10-17 NOTE — TELEPHONE ENCOUNTER
Notified patient of results/recommendations. Patient verbalized understanding. He said he did have a lipid panel done with his PCP. I will call them and have them fax those to our office. 1 year FU scheduled.    Tabitha Bundy RN  Triage [facility] [place]

## 2022-10-18 NOTE — PROGRESS NOTES
Lipid panel on 09/30/22  Total cholesterol 232, HDL 75, , triglyceride 118  He has a normal CMP and CBC on that date  Encourage lifestyle changes

## 2023-10-02 ENCOUNTER — OFFICE VISIT (OUTPATIENT)
Dept: CARDIOLOGY | Facility: CLINIC | Age: 48
End: 2023-10-02
Payer: MEDICARE

## 2023-10-02 VITALS
HEART RATE: 76 BPM | DIASTOLIC BLOOD PRESSURE: 70 MMHG | WEIGHT: 180.7 LBS | BODY MASS INDEX: 28.36 KG/M2 | HEIGHT: 67 IN | SYSTOLIC BLOOD PRESSURE: 126 MMHG

## 2023-10-02 DIAGNOSIS — G47.33 OSA (OBSTRUCTIVE SLEEP APNEA): Primary | ICD-10-CM

## 2023-10-02 PROCEDURE — 99213 OFFICE O/P EST LOW 20 MIN: CPT | Performed by: INTERNAL MEDICINE

## 2023-10-02 RX ORDER — PHENYTOIN SODIUM 100 MG/1
2 CAPSULE, EXTENDED RELEASE ORAL EVERY 12 HOURS SCHEDULED
COMMUNITY
Start: 2023-06-06